# Patient Record
Sex: FEMALE | Race: WHITE | NOT HISPANIC OR LATINO | URBAN - METROPOLITAN AREA
[De-identification: names, ages, dates, MRNs, and addresses within clinical notes are randomized per-mention and may not be internally consistent; named-entity substitution may affect disease eponyms.]

---

## 2020-09-23 ENCOUNTER — INPATIENT (INPATIENT)
Facility: HOSPITAL | Age: 40
LOS: 1 days | Discharge: HOME | End: 2020-09-25
Attending: SURGERY | Admitting: SURGERY
Payer: COMMERCIAL

## 2020-09-23 VITALS
SYSTOLIC BLOOD PRESSURE: 166 MMHG | OXYGEN SATURATION: 96 % | WEIGHT: 179.9 LBS | HEIGHT: 70 IN | DIASTOLIC BLOOD PRESSURE: 104 MMHG | RESPIRATION RATE: 20 BRPM | TEMPERATURE: 100 F | HEART RATE: 75 BPM

## 2020-09-23 DIAGNOSIS — F41.9 ANXIETY DISORDER, UNSPECIFIED: ICD-10-CM

## 2020-09-23 DIAGNOSIS — K35.80 UNSPECIFIED ACUTE APPENDICITIS: ICD-10-CM

## 2020-09-23 DIAGNOSIS — F32.9 MAJOR DEPRESSIVE DISORDER, SINGLE EPISODE, UNSPECIFIED: Chronic | ICD-10-CM

## 2020-09-23 DIAGNOSIS — F32.9 MAJOR DEPRESSIVE DISORDER, SINGLE EPISODE, UNSPECIFIED: ICD-10-CM

## 2020-09-23 DIAGNOSIS — M54.9 DORSALGIA, UNSPECIFIED: ICD-10-CM

## 2020-09-23 LAB
ALBUMIN SERPL ELPH-MCNC: 4.1 G/DL — SIGNIFICANT CHANGE UP (ref 3.5–5.2)
ALP SERPL-CCNC: 77 U/L — SIGNIFICANT CHANGE UP (ref 30–115)
ALT FLD-CCNC: 22 U/L — SIGNIFICANT CHANGE UP (ref 0–41)
ANION GAP SERPL CALC-SCNC: 10 MMOL/L — SIGNIFICANT CHANGE UP (ref 7–14)
APPEARANCE UR: CLEAR — SIGNIFICANT CHANGE UP
AST SERPL-CCNC: 17 U/L — SIGNIFICANT CHANGE UP (ref 0–41)
BASOPHILS # BLD AUTO: 0.03 K/UL — SIGNIFICANT CHANGE UP (ref 0–0.2)
BASOPHILS NFR BLD AUTO: 0.4 % — SIGNIFICANT CHANGE UP (ref 0–1)
BILIRUB SERPL-MCNC: 0.2 MG/DL — SIGNIFICANT CHANGE UP (ref 0.2–1.2)
BILIRUB UR-MCNC: NEGATIVE — SIGNIFICANT CHANGE UP
BUN SERPL-MCNC: 16 MG/DL — SIGNIFICANT CHANGE UP (ref 10–20)
CALCIUM SERPL-MCNC: 9.6 MG/DL — SIGNIFICANT CHANGE UP (ref 8.5–10.1)
CHLORIDE SERPL-SCNC: 103 MMOL/L — SIGNIFICANT CHANGE UP (ref 98–110)
CO2 SERPL-SCNC: 29 MMOL/L — SIGNIFICANT CHANGE UP (ref 17–32)
COLOR SPEC: YELLOW — SIGNIFICANT CHANGE UP
CREAT SERPL-MCNC: 0.9 MG/DL — SIGNIFICANT CHANGE UP (ref 0.7–1.5)
DIFF PNL FLD: NEGATIVE — SIGNIFICANT CHANGE UP
EOSINOPHIL # BLD AUTO: 0.17 K/UL — SIGNIFICANT CHANGE UP (ref 0–0.7)
EOSINOPHIL NFR BLD AUTO: 2.2 % — SIGNIFICANT CHANGE UP (ref 0–8)
GLUCOSE SERPL-MCNC: 96 MG/DL — SIGNIFICANT CHANGE UP (ref 70–99)
GLUCOSE UR QL: NEGATIVE MG/DL — SIGNIFICANT CHANGE UP
HCG SERPL QL: NEGATIVE — SIGNIFICANT CHANGE UP
HCT VFR BLD CALC: 38.3 % — SIGNIFICANT CHANGE UP (ref 37–47)
HGB BLD-MCNC: 11.9 G/DL — LOW (ref 12–16)
IMM GRANULOCYTES NFR BLD AUTO: 0.3 % — SIGNIFICANT CHANGE UP (ref 0.1–0.3)
KETONES UR-MCNC: NEGATIVE — SIGNIFICANT CHANGE UP
LEUKOCYTE ESTERASE UR-ACNC: NEGATIVE — SIGNIFICANT CHANGE UP
LIDOCAIN IGE QN: 38 U/L — SIGNIFICANT CHANGE UP (ref 7–60)
LYMPHOCYTES # BLD AUTO: 2.36 K/UL — SIGNIFICANT CHANGE UP (ref 1.2–3.4)
LYMPHOCYTES # BLD AUTO: 30.5 % — SIGNIFICANT CHANGE UP (ref 20.5–51.1)
MCHC RBC-ENTMCNC: 27 PG — SIGNIFICANT CHANGE UP (ref 27–31)
MCHC RBC-ENTMCNC: 31.1 G/DL — LOW (ref 32–37)
MCV RBC AUTO: 86.8 FL — SIGNIFICANT CHANGE UP (ref 81–99)
MONOCYTES # BLD AUTO: 0.55 K/UL — SIGNIFICANT CHANGE UP (ref 0.1–0.6)
MONOCYTES NFR BLD AUTO: 7.1 % — SIGNIFICANT CHANGE UP (ref 1.7–9.3)
NEUTROPHILS # BLD AUTO: 4.62 K/UL — SIGNIFICANT CHANGE UP (ref 1.4–6.5)
NEUTROPHILS NFR BLD AUTO: 59.5 % — SIGNIFICANT CHANGE UP (ref 42.2–75.2)
NITRITE UR-MCNC: NEGATIVE — SIGNIFICANT CHANGE UP
NRBC # BLD: 0 /100 WBCS — SIGNIFICANT CHANGE UP (ref 0–0)
PH UR: 6.5 — SIGNIFICANT CHANGE UP (ref 5–8)
PLATELET # BLD AUTO: 231 K/UL — SIGNIFICANT CHANGE UP (ref 130–400)
POTASSIUM SERPL-MCNC: 4.4 MMOL/L — SIGNIFICANT CHANGE UP (ref 3.5–5)
POTASSIUM SERPL-SCNC: 4.4 MMOL/L — SIGNIFICANT CHANGE UP (ref 3.5–5)
PROT SERPL-MCNC: 6.5 G/DL — SIGNIFICANT CHANGE UP (ref 6–8)
PROT UR-MCNC: NEGATIVE MG/DL — SIGNIFICANT CHANGE UP
RBC # BLD: 4.41 M/UL — SIGNIFICANT CHANGE UP (ref 4.2–5.4)
RBC # FLD: 13.6 % — SIGNIFICANT CHANGE UP (ref 11.5–14.5)
SODIUM SERPL-SCNC: 142 MMOL/L — SIGNIFICANT CHANGE UP (ref 135–146)
SP GR SPEC: 1.01 — SIGNIFICANT CHANGE UP (ref 1.01–1.03)
UROBILINOGEN FLD QL: 0.2 MG/DL — SIGNIFICANT CHANGE UP (ref 0.2–0.2)
WBC # BLD: 7.75 K/UL — SIGNIFICANT CHANGE UP (ref 4.8–10.8)
WBC # FLD AUTO: 7.75 K/UL — SIGNIFICANT CHANGE UP (ref 4.8–10.8)

## 2020-09-23 PROCEDURE — 76856 US EXAM PELVIC COMPLETE: CPT | Mod: 26

## 2020-09-23 PROCEDURE — 74177 CT ABD & PELVIS W/CONTRAST: CPT | Mod: 26

## 2020-09-23 PROCEDURE — 99285 EMERGENCY DEPT VISIT HI MDM: CPT

## 2020-09-23 RX ORDER — CEFOTETAN DISODIUM 1 G
1 VIAL (EA) INJECTION ONCE
Refills: 0 | Status: DISCONTINUED | OUTPATIENT
Start: 2020-09-23 | End: 2020-09-23

## 2020-09-23 RX ORDER — ACETAMINOPHEN 500 MG
975 TABLET ORAL ONCE
Refills: 0 | Status: COMPLETED | OUTPATIENT
Start: 2020-09-23 | End: 2020-09-23

## 2020-09-23 RX ORDER — CEFOTETAN DISODIUM 1 G
2 VIAL (EA) INJECTION ONCE
Refills: 0 | Status: COMPLETED | OUTPATIENT
Start: 2020-09-23 | End: 2020-09-23

## 2020-09-23 RX ORDER — MORPHINE SULFATE 50 MG/1
4 CAPSULE, EXTENDED RELEASE ORAL ONCE
Refills: 0 | Status: DISCONTINUED | OUTPATIENT
Start: 2020-09-23 | End: 2020-09-23

## 2020-09-23 RX ADMIN — Medication 100 GRAM(S): at 23:23

## 2020-09-23 RX ADMIN — MORPHINE SULFATE 4 MILLIGRAM(S): 50 CAPSULE, EXTENDED RELEASE ORAL at 23:38

## 2020-09-23 RX ADMIN — Medication 975 MILLIGRAM(S): at 19:38

## 2020-09-23 RX ADMIN — Medication 975 MILLIGRAM(S): at 21:42

## 2020-09-23 RX ADMIN — MORPHINE SULFATE 4 MILLIGRAM(S): 50 CAPSULE, EXTENDED RELEASE ORAL at 23:23

## 2020-09-23 NOTE — H&P ADULT - ASSESSMENT
39 y/o female with PMH of depression, anxiety, chronic lower back pain presents to the ED with c/o RLQ pain x 1 day. Pt also states, she started having lower back pain about 2 weeks ago but this pain feels different. Admits to nausea and vomiting x 1 episode yesterday but nothing today. Pt also admits to some chills but no fever. Denies CP, cough, SOB and palpitations.

## 2020-09-23 NOTE — H&P ADULT - NSHPLABSRESULTS_GEN_ALL_CORE
11.9   7.75  )-----------( 231      ( 23 Sep 2020 19:55 )             38.3         142  |  103  |  16  ----------------------------<  96  4.4   |  29  |  0.9    Ca    9.6      23 Sep 2020 19:55    TPro  6.5  /  Alb  4.1  /  TBili  0.2  /  DBili  x   /  AST  17  /  ALT  22  /  AlkPhos  77            Urinalysis Basic - ( 23 Sep 2020 19:19 )    Color: Yellow / Appearance: Clear / S.015 / pH: x  Gluc: x / Ketone: Negative  / Bili: Negative / Urobili: 0.2 mg/dL   Blood: x / Protein: Negative mg/dL / Nitrite: Negative   Leuk Esterase: Negative / RBC: x / WBC x   Sq Epi: x / Non Sq Epi: x / Bacteria: x    < from: CT Abdomen and Pelvis w/ IV Cont (20 @ 22:38) >      IMPRESSION:    Acute appendicitis. No focal drainable fluid collection or pneumoperitoneum.          < end of copied text >            CAPILLARY BLOOD GLUCOSE

## 2020-09-23 NOTE — H&P ADULT - NSHPPHYSICALEXAM_GEN_ALL_CORE
Vital Signs Last 24 Hrs  T(C): 37.4 (23 Sep 2020 21:41), Max: 37.9 (23 Sep 2020 19:13)  T(F): 99.3 (23 Sep 2020 21:41), Max: 100.3 (23 Sep 2020 19:13)  HR: 64 (23 Sep 2020 21:41) (64 - 75)  BP: 139/88 (23 Sep 2020 21:41) (139/88 - 166/104)  BP(mean): --  RR: 18 (23 Sep 2020 21:41) (18 - 20)  SpO2: 99% (23 Sep 2020 21:41) (96% - 99%)        Constitutional: NAD, A&O x3    Eyes: PERRLA, no conjuctivitis    Neck: no lymphadenopathy    Respiratory: +air entry, no rales, no rhonchi, no wheezes    Cardiovascular: +S1 and S2, regular rate and rhythm    Gastrointestinal: +BS, soft, (+) TTP RLQ, no rebound, not distended    Extremities:  no edema, no calf tenderness    Neurological: sensation intact, ROM equal B/L, CN II-XII intact    Skin: no rashes, normal turgor Vital Signs Last 24 Hrs  T(C): 37.4 (23 Sep 2020 21:41), Max: 37.9 (23 Sep 2020 19:13)  T(F): 99.3 (23 Sep 2020 21:41), Max: 100.3 (23 Sep 2020 19:13)  HR: 64 (23 Sep 2020 21:41) (64 - 75)  BP: 139/88 (23 Sep 2020 21:41) (139/88 - 166/104)  RR: 18 (23 Sep 2020 21:41) (18 - 20)  SpO2: 99% (23 Sep 2020 21:41) (96% - 99%)        Constitutional: NAD, A&O x3    Eyes: PERRLA, no conjuctivitis    Neck: no lymphadenopathy    Respiratory: +air entry, no rales, no rhonchi, no wheezes    Cardiovascular: +S1 and S2, regular rate and rhythm    Gastrointestinal: +BS, soft, (+) TTP RLQ, no rebound, not distended    Extremities:  no edema, no calf tenderness    Neurological: sensation intact, ROM equal B/L, CN II-XII intact, decreased left leg raise    Skin: no rashes, normal turgor

## 2020-09-23 NOTE — ED PROVIDER NOTE - OBJECTIVE STATEMENT
The pt is a 40y F w/ chronic lumbar back pain presenting with lumbar pain x2 weeks, abdominal pain that started today. Back pain feels worse than prior episodes. Not alleviated by medications at home. She went to a chiropractor on Monday and since then the pain has been worse. Abdominal pain described as RLQ with no associated N/V. No fever at home. Denies dysuria/hematuria. Menstrual cycles normal, LMP 9/5. Denies headache, chest pain, SOB, diarrhea.

## 2020-09-23 NOTE — ED PROVIDER NOTE - PROGRESS NOTE DETAILS
Ct abd showing acute appendicitis. 2g Cefotetan ordered. Surgery consulted. Pending covid swab, pt will then be admitted under Dr. Damno.

## 2020-09-23 NOTE — ED PROVIDER NOTE - PHYSICAL EXAMINATION
Vital Signs: I have reviewed the initial vital signs. (+) Oral temp 100.3.   Constitutional: well-nourished, appears stated age, no acute distress  Cardiovascular: regular rate, regular rhythm, well-perfused extremities  Respiratory: unlabored respiratory effort, clear to auscultation bilaterally  Gastrointestinal: soft, (+) tender abdomen in the RLQ. Neg Rovsing.   Musculoskeletal: supple neck, no lower extremity edema. (-) Lumbar back, midline and paraspinal, not ttp.   Integumentary: warm, dry, no rash  Neurologic: awake, alert, extremities’ motor and sensory functions grossly intact  Psychiatric: appropriate mood, appropriate affect

## 2020-09-23 NOTE — ED PROVIDER NOTE - NS ED ROS FT
Constitutional: (-) fever  Eyes/ENT: (-) blurry vision, (-) epistaxis  Cardiovascular: (-) chest pain, (-) syncope  Respiratory: (-) cough, (-) shortness of breath  Gastrointestinal: (-) vomiting, (-) diarrhea, (+) RLQ pain   Musculoskeletal: (-) neck pain, (+) back pain, (-) joint pain  Integumentary: (-) rash, (-) edema  Neurological: (-) headache, (-) altered mental status  Psychiatric: (-) hallucinations  Allergic/Immunologic: (-) pruritus

## 2020-09-23 NOTE — H&P ADULT - ATTENDING COMMENTS
Patient seen and examined with surgery PA on rounds and discussed management plans. Obese built female hospitalized early this am with 2 days of increasing back pain mainly on left side, seen by chiropractor on Monday and Tuesday had some nausea and increased right sided abd discomfort which decreased and normal appetite and bowel function. works as a teacher. Ct scan and labs reviewed. Normal WBC with decrease this am. Ct mild dilated appendix with some surrounding inflammation. appendix retrocecal. Explained to patients options of continuing antibiotics as improving vs surgery for laparoscopic appendectomy. Patient will discuss with  and let us know. Concern fro sleep apnea as history of snoring.

## 2020-09-23 NOTE — ED PROVIDER NOTE - CLINICAL SUMMARY MEDICAL DECISION MAKING FREE TEXT BOX
pt with chronic back pain presenting with abdominal pain today. RLQ. No fevers/chills, nausea/vomiting/diarrhea, dysuria/hematuria, vaginal bleeding/discharge. Well appearing, NAD, non toxic. NCAT PERRLA EOMI neck supple non tender normal wob regular abdomen s +RLQ ttp no rebound no guarding nd no rebound no guarding WWPx4 neuro non focal. labs imaging reviewed. dw surgery. will admit

## 2020-09-23 NOTE — H&P ADULT - HISTORY OF PRESENT ILLNESS
41 y/o female with PMH of depression, anxiety, chronic lower back pain presents to the ED with c/o RLQ pain x 1 day. Pt also states, she started having lower back pain about 2 weeks ago but this pain feels different. Admits to nausea and vomiting x 1 episode yesterday but nothing today. Pt also admits to some chills but no fever. Denies CP, cough, SOB and palpitations.

## 2020-09-23 NOTE — ED ADULT TRIAGE NOTE - CHIEF COMPLAINT QUOTE
"I saw the chiropractor on Monday due to back pain x2 months, now I am in severe back pain with lower pelvic pain"

## 2020-09-24 ENCOUNTER — RESULT REVIEW (OUTPATIENT)
Age: 40
End: 2020-09-24

## 2020-09-24 DIAGNOSIS — K35.80 UNSPECIFIED ACUTE APPENDICITIS: ICD-10-CM

## 2020-09-24 LAB
ANION GAP SERPL CALC-SCNC: 9 MMOL/L — SIGNIFICANT CHANGE UP (ref 7–14)
APTT BLD: 32.1 SEC — SIGNIFICANT CHANGE UP (ref 27–39.2)
BUN SERPL-MCNC: 16 MG/DL — SIGNIFICANT CHANGE UP (ref 10–20)
CALCIUM SERPL-MCNC: 8.9 MG/DL — SIGNIFICANT CHANGE UP (ref 8.5–10.1)
CHLORIDE SERPL-SCNC: 104 MMOL/L — SIGNIFICANT CHANGE UP (ref 98–110)
CO2 SERPL-SCNC: 27 MMOL/L — SIGNIFICANT CHANGE UP (ref 17–32)
CREAT SERPL-MCNC: 0.9 MG/DL — SIGNIFICANT CHANGE UP (ref 0.7–1.5)
GLUCOSE SERPL-MCNC: 93 MG/DL — SIGNIFICANT CHANGE UP (ref 70–99)
HCT VFR BLD CALC: 38.5 % — SIGNIFICANT CHANGE UP (ref 37–47)
HGB BLD-MCNC: 12.4 G/DL — SIGNIFICANT CHANGE UP (ref 12–16)
INR BLD: 1.05 RATIO — SIGNIFICANT CHANGE UP (ref 0.65–1.3)
MAGNESIUM SERPL-MCNC: 2 MG/DL — SIGNIFICANT CHANGE UP (ref 1.8–2.4)
MCHC RBC-ENTMCNC: 27.9 PG — SIGNIFICANT CHANGE UP (ref 27–31)
MCHC RBC-ENTMCNC: 32.2 G/DL — SIGNIFICANT CHANGE UP (ref 32–37)
MCV RBC AUTO: 86.5 FL — SIGNIFICANT CHANGE UP (ref 81–99)
NRBC # BLD: 0 /100 WBCS — SIGNIFICANT CHANGE UP (ref 0–0)
PHOSPHATE SERPL-MCNC: 4.9 MG/DL — SIGNIFICANT CHANGE UP (ref 2.1–4.9)
PLATELET # BLD AUTO: 201 K/UL — SIGNIFICANT CHANGE UP (ref 130–400)
POTASSIUM SERPL-MCNC: 5.1 MMOL/L — HIGH (ref 3.5–5)
POTASSIUM SERPL-SCNC: 5.1 MMOL/L — HIGH (ref 3.5–5)
PROTHROM AB SERPL-ACNC: 12.1 SEC — SIGNIFICANT CHANGE UP (ref 9.95–12.87)
RBC # BLD: 4.45 M/UL — SIGNIFICANT CHANGE UP (ref 4.2–5.4)
RBC # FLD: 13.6 % — SIGNIFICANT CHANGE UP (ref 11.5–14.5)
SARS-COV-2 RNA SPEC QL NAA+PROBE: SIGNIFICANT CHANGE UP
SODIUM SERPL-SCNC: 140 MMOL/L — SIGNIFICANT CHANGE UP (ref 135–146)
WBC # BLD: 5.8 K/UL — SIGNIFICANT CHANGE UP (ref 4.8–10.8)
WBC # FLD AUTO: 5.8 K/UL — SIGNIFICANT CHANGE UP (ref 4.8–10.8)

## 2020-09-24 PROCEDURE — 88304 TISSUE EXAM BY PATHOLOGIST: CPT | Mod: 26

## 2020-09-24 PROCEDURE — 99222 1ST HOSP IP/OBS MODERATE 55: CPT

## 2020-09-24 PROCEDURE — 44970 LAPAROSCOPY APPENDECTOMY: CPT

## 2020-09-24 RX ORDER — DEXTROSE MONOHYDRATE, SODIUM CHLORIDE, AND POTASSIUM CHLORIDE 50; .745; 4.5 G/1000ML; G/1000ML; G/1000ML
1000 INJECTION, SOLUTION INTRAVENOUS
Refills: 0 | Status: DISCONTINUED | OUTPATIENT
Start: 2020-09-24 | End: 2020-09-24

## 2020-09-24 RX ORDER — MORPHINE SULFATE 50 MG/1
4 CAPSULE, EXTENDED RELEASE ORAL EVERY 4 HOURS
Refills: 0 | Status: DISCONTINUED | OUTPATIENT
Start: 2020-09-24 | End: 2020-09-25

## 2020-09-24 RX ORDER — IBUPROFEN 200 MG
400 TABLET ORAL EVERY 6 HOURS
Refills: 0 | Status: DISCONTINUED | OUTPATIENT
Start: 2020-09-24 | End: 2020-09-25

## 2020-09-24 RX ORDER — ONDANSETRON 8 MG/1
4 TABLET, FILM COATED ORAL EVERY 6 HOURS
Refills: 0 | Status: DISCONTINUED | OUTPATIENT
Start: 2020-09-24 | End: 2020-09-25

## 2020-09-24 RX ORDER — CEFOTETAN DISODIUM 1 G
1 VIAL (EA) INJECTION EVERY 12 HOURS
Refills: 0 | Status: DISCONTINUED | OUTPATIENT
Start: 2020-09-24 | End: 2020-09-25

## 2020-09-24 RX ORDER — CHLORHEXIDINE GLUCONATE 213 G/1000ML
1 SOLUTION TOPICAL
Refills: 0 | Status: DISCONTINUED | OUTPATIENT
Start: 2020-09-24 | End: 2020-09-24

## 2020-09-24 RX ORDER — MORPHINE SULFATE 50 MG/1
4 CAPSULE, EXTENDED RELEASE ORAL ONCE
Refills: 0 | Status: DISCONTINUED | OUTPATIENT
Start: 2020-09-24 | End: 2020-09-24

## 2020-09-24 RX ORDER — SODIUM CHLORIDE 9 MG/ML
1000 INJECTION INTRAMUSCULAR; INTRAVENOUS; SUBCUTANEOUS
Refills: 0 | Status: DISCONTINUED | OUTPATIENT
Start: 2020-09-24 | End: 2020-09-24

## 2020-09-24 RX ORDER — CEFOTETAN DISODIUM 1 G
1 VIAL (EA) INJECTION ONCE
Refills: 0 | Status: COMPLETED | OUTPATIENT
Start: 2020-09-24 | End: 2020-09-24

## 2020-09-24 RX ORDER — HYDROMORPHONE HYDROCHLORIDE 2 MG/ML
0.5 INJECTION INTRAMUSCULAR; INTRAVENOUS; SUBCUTANEOUS
Refills: 0 | Status: DISCONTINUED | OUTPATIENT
Start: 2020-09-24 | End: 2020-09-25

## 2020-09-24 RX ORDER — ONDANSETRON 8 MG/1
4 TABLET, FILM COATED ORAL EVERY 6 HOURS
Refills: 0 | Status: DISCONTINUED | OUTPATIENT
Start: 2020-09-24 | End: 2020-09-24

## 2020-09-24 RX ORDER — HEPARIN SODIUM 5000 [USP'U]/ML
5000 INJECTION INTRAVENOUS; SUBCUTANEOUS EVERY 8 HOURS
Refills: 0 | Status: DISCONTINUED | OUTPATIENT
Start: 2020-09-24 | End: 2020-09-25

## 2020-09-24 RX ORDER — SODIUM CHLORIDE 9 MG/ML
1000 INJECTION INTRAMUSCULAR; INTRAVENOUS; SUBCUTANEOUS
Refills: 0 | Status: DISCONTINUED | OUTPATIENT
Start: 2020-09-24 | End: 2020-09-25

## 2020-09-24 RX ORDER — FAMOTIDINE 10 MG/ML
20 INJECTION INTRAVENOUS EVERY 12 HOURS
Refills: 0 | Status: DISCONTINUED | OUTPATIENT
Start: 2020-09-24 | End: 2020-09-25

## 2020-09-24 RX ORDER — CEFOTETAN DISODIUM 1 G
VIAL (EA) INJECTION
Refills: 0 | Status: DISCONTINUED | OUTPATIENT
Start: 2020-09-24 | End: 2020-09-24

## 2020-09-24 RX ORDER — KETOROLAC TROMETHAMINE 30 MG/ML
30 SYRINGE (ML) INJECTION ONCE
Refills: 0 | Status: DISCONTINUED | OUTPATIENT
Start: 2020-09-24 | End: 2020-09-24

## 2020-09-24 RX ORDER — HYDROMORPHONE HYDROCHLORIDE 2 MG/ML
1 INJECTION INTRAMUSCULAR; INTRAVENOUS; SUBCUTANEOUS
Refills: 0 | Status: DISCONTINUED | OUTPATIENT
Start: 2020-09-24 | End: 2020-09-25

## 2020-09-24 RX ORDER — SODIUM CHLORIDE 9 MG/ML
1000 INJECTION, SOLUTION INTRAVENOUS
Refills: 0 | Status: DISCONTINUED | OUTPATIENT
Start: 2020-09-24 | End: 2020-09-25

## 2020-09-24 RX ORDER — ESCITALOPRAM OXALATE 10 MG/1
1 TABLET, FILM COATED ORAL
Qty: 0 | Refills: 0 | DISCHARGE

## 2020-09-24 RX ORDER — OXYCODONE AND ACETAMINOPHEN 5; 325 MG/1; MG/1
1 TABLET ORAL ONCE
Refills: 0 | Status: DISCONTINUED | OUTPATIENT
Start: 2020-09-24 | End: 2020-09-25

## 2020-09-24 RX ORDER — ESCITALOPRAM OXALATE 10 MG/1
10 TABLET, FILM COATED ORAL DAILY
Refills: 0 | Status: DISCONTINUED | OUTPATIENT
Start: 2020-09-24 | End: 2020-09-24

## 2020-09-24 RX ORDER — CEFOTETAN DISODIUM 1 G
1 VIAL (EA) INJECTION EVERY 12 HOURS
Refills: 0 | Status: DISCONTINUED | OUTPATIENT
Start: 2020-09-24 | End: 2020-09-24

## 2020-09-24 RX ORDER — ONDANSETRON 8 MG/1
4 TABLET, FILM COATED ORAL ONCE
Refills: 0 | Status: DISCONTINUED | OUTPATIENT
Start: 2020-09-24 | End: 2020-09-25

## 2020-09-24 RX ORDER — ACETAMINOPHEN 500 MG
650 TABLET ORAL EVERY 6 HOURS
Refills: 0 | Status: DISCONTINUED | OUTPATIENT
Start: 2020-09-24 | End: 2020-09-25

## 2020-09-24 RX ORDER — FAMOTIDINE 10 MG/ML
20 INJECTION INTRAVENOUS
Refills: 0 | Status: DISCONTINUED | OUTPATIENT
Start: 2020-09-24 | End: 2020-09-24

## 2020-09-24 RX ORDER — MORPHINE SULFATE 50 MG/1
2 CAPSULE, EXTENDED RELEASE ORAL EVERY 4 HOURS
Refills: 0 | Status: DISCONTINUED | OUTPATIENT
Start: 2020-09-24 | End: 2020-09-24

## 2020-09-24 RX ADMIN — DEXTROSE MONOHYDRATE, SODIUM CHLORIDE, AND POTASSIUM CHLORIDE 100 MILLILITER(S): 50; .745; 4.5 INJECTION, SOLUTION INTRAVENOUS at 03:33

## 2020-09-24 RX ADMIN — Medication 100 GRAM(S): at 17:04

## 2020-09-24 RX ADMIN — MORPHINE SULFATE 4 MILLIGRAM(S): 50 CAPSULE, EXTENDED RELEASE ORAL at 03:32

## 2020-09-24 RX ADMIN — FAMOTIDINE 20 MILLIGRAM(S): 10 INJECTION INTRAVENOUS at 11:12

## 2020-09-24 RX ADMIN — MORPHINE SULFATE 2 MILLIGRAM(S): 50 CAPSULE, EXTENDED RELEASE ORAL at 08:38

## 2020-09-24 RX ADMIN — ESCITALOPRAM OXALATE 10 MILLIGRAM(S): 10 TABLET, FILM COATED ORAL at 10:18

## 2020-09-24 RX ADMIN — MORPHINE SULFATE 2 MILLIGRAM(S): 50 CAPSULE, EXTENDED RELEASE ORAL at 15:15

## 2020-09-24 RX ADMIN — Medication 30 MILLIGRAM(S): at 13:02

## 2020-09-24 RX ADMIN — Medication 30 MILLIGRAM(S): at 14:00

## 2020-09-24 RX ADMIN — MORPHINE SULFATE 2 MILLIGRAM(S): 50 CAPSULE, EXTENDED RELEASE ORAL at 09:19

## 2020-09-24 RX ADMIN — SODIUM CHLORIDE 150 MILLILITER(S): 9 INJECTION, SOLUTION INTRAVENOUS at 23:40

## 2020-09-24 RX ADMIN — MORPHINE SULFATE 4 MILLIGRAM(S): 50 CAPSULE, EXTENDED RELEASE ORAL at 03:55

## 2020-09-24 RX ADMIN — SODIUM CHLORIDE 100 MILLILITER(S): 9 INJECTION INTRAMUSCULAR; INTRAVENOUS; SUBCUTANEOUS at 11:41

## 2020-09-24 RX ADMIN — MORPHINE SULFATE 2 MILLIGRAM(S): 50 CAPSULE, EXTENDED RELEASE ORAL at 15:42

## 2020-09-24 NOTE — PROGRESS NOTE ADULT - ATTENDING COMMENTS
Patient seen and examined with surgery PA on rounds and discussed management plans. informed consent was signed for laparoscopic appendectomy.

## 2020-09-24 NOTE — ED ADULT NURSE REASSESSMENT NOTE - NS ED NURSE REASSESS COMMENT FT1
spoke with HOMAR Bland regarding STAT medication orders, orders not needed to be given now, as per HOMAR Bland orders to be pushed til morning, not STAT orders

## 2020-09-24 NOTE — PROGRESS NOTE ADULT - SUBJECTIVE AND OBJECTIVE BOX
GENERAL SURGERY PROGRESS NOTE     LUCHO ESTRADA  40yFemale    Surgical Attending: Mahi Damon  Overnight events: No acute events overnight. Still with complaints of abdominal pain.    T(F): 98.2 (20 @ 14:41), Max: 100.3 (20 @ 19:13)  HR: 56 (20 @ 16:12) (54 - 75)  BP: 159/83 (20 @ 16:12) (139/88 - 196/98)  ABP: --  ABP(mean): --  RR: 18 (20 @ 14:41) (18 - 20)  SpO2: 99% (20 @ 21:41) (96% - 99%)      DIET/FLUIDS: sodium chloride 0.9%. 1000 milliLiter(s) (100 mL/Hr) IV Continuous <Continuous>    GI proph:  famotidine Injectable 20 milliGRAM(s) IV Push two times a day    AC/ proph:   ABx: cefoTEtan  IVPB      cefoTEtan  IVPB 1 Gram(s) IV Intermittent every 12 hours      PHYSICAL EXAM:  GENERAL: NAD, well-appearing  CHEST/LUNG: Clear to auscultation bilaterally  HEART: Regular rate and rhythm  ABDOMEN: Soft, RLQ tenderness, no guarding or rebound  EXTREMITIES:  No clubbing, cyanosis, or edema      LABS  Labs:  CAPILLARY BLOOD GLUCOSE                              12.4   5.80  )-----------( 201      ( 24 Sep 2020 06:56 )             38.5       Auto Neutrophil %: 59.5 % (20 @ 19:55)  Auto Immature Granulocyte %: 0.3 % (20 @ 19:55)        140  |  104  |  16  ----------------------------<  93  5.1<H>   |  27  |  0.9      Calcium, Total Serum: 8.9 mg/dL (20 @ 06:56)      LFTs:             6.5  | 0.2  | 17       ------------------[77      ( 23 Sep 2020 19:55 )  4.1  | x    | 22          Lipase:38     Amylase:x             Coags:     12.10  ----< 1.05    ( 24 Sep 2020 06:56 )     32.1            Urinalysis Basic - ( 23 Sep 2020 19:19 )    Color: Yellow / Appearance: Clear / S.015 / pH: x  Gluc: x / Ketone: Negative  / Bili: Negative / Urobili: 0.2 mg/dL   Blood: x / Protein: Negative mg/dL / Nitrite: Negative   Leuk Esterase: Negative / RBC: x / WBC x   Sq Epi: x / Non Sq Epi: x / Bacteria: x             GENERAL SURGERY PROGRESS NOTE     LUCHO ESTRADA  40yFemale    Surgical Attending: Mahi Damon  Overnight events: No acute events overnight. Still with complaints of abdominal pain.    T(F): 98.2 (20 @ 14:41), Max: 100.3 (20 @ 19:13)  HR: 56 (20 @ 16:12) (54 - 75)  BP: 159/83 (20 @ 16:12) (139/88 - 196/98)  RR: 18 (20 @ 14:41) (18 - 20)  SpO2: 99% (20 @ 21:41) (96% - 99%)      DIET/FLUIDS: sodium chloride 0.9%. 1000 milliLiter(s) (100 mL/Hr) IV Continuous <Continuous>    GI proph:  famotidine Injectable 20 milliGRAM(s) IV Push two times a day    AC/ proph:   ABx: cefoTEtan  IVPB      cefoTEtan  IVPB 1 Gram(s) IV Intermittent every 12 hours      PHYSICAL EXAM:  GENERAL: NAD, well-appearing  CHEST/LUNG: Clear to auscultation bilaterally  HEART: Regular rate and rhythm  ABDOMEN: Soft, RLQ tenderness, no guarding or rebound  EXTREMITIES:  No clubbing, cyanosis, or edema      LABS  Labs:  CAPILLARY BLOOD GLUCOSE                              12.4   5.80  )-----------( 201      ( 24 Sep 2020 06:56 )             38.5       Auto Neutrophil %: 59.5 % (20 @ 19:55)  Auto Immature Granulocyte %: 0.3 % (20 @ 19:55)        140  |  104  |  16  ----------------------------<  93  5.1<H>   |  27  |  0.9      Calcium, Total Serum: 8.9 mg/dL (20 @ 06:56)      LFTs:             6.5  | 0.2  | 17       ------------------[77      ( 23 Sep 2020 19:55 )  4.1  | x    | 22          Lipase:38     Amylase:x             Coags:     12.10  ----< 1.05    ( 24 Sep 2020 06:56 )     32.1            Urinalysis Basic - ( 23 Sep 2020 19:19 )    Color: Yellow / Appearance: Clear / S.015 / pH: x  Gluc: x / Ketone: Negative  / Bili: Negative / Urobili: 0.2 mg/dL   Blood: x / Protein: Negative mg/dL / Nitrite: Negative   Leuk Esterase: Negative / RBC: x / WBC x   Sq Epi: x / Non Sq Epi: x / Bacteria: x

## 2020-09-24 NOTE — BRIEF OPERATIVE NOTE - OPERATION/FINDINGS
retrocecal appendix, inflamed appendix, inflamed and thickened, some bleeding from mesoappendix controlled with clips

## 2020-09-24 NOTE — PROGRESS NOTE ADULT - ASSESSMENT
A/P:  LUCHO ESTRADA is a 40yFemale HD 2 currently admitted with acute appendicitis.      PLAN:   NPO  pain control  IVF  IV abx  Add on for laparoscopic appendectomy    Discussed plan with Dr. Damon

## 2020-09-25 ENCOUNTER — TRANSCRIPTION ENCOUNTER (OUTPATIENT)
Age: 40
End: 2020-09-25

## 2020-09-25 VITALS
DIASTOLIC BLOOD PRESSURE: 87 MMHG | SYSTOLIC BLOOD PRESSURE: 139 MMHG | HEART RATE: 91 BPM | RESPIRATION RATE: 18 BRPM | TEMPERATURE: 98 F

## 2020-09-25 LAB
SARS-COV-2 IGG SERPL QL IA: POSITIVE
SARS-COV-2 IGM SERPL IA-ACNC: 25.5 INDEX — HIGH

## 2020-09-25 PROCEDURE — 99024 POSTOP FOLLOW-UP VISIT: CPT

## 2020-09-25 RX ORDER — IBUPROFEN 200 MG
1 TABLET ORAL
Qty: 21 | Refills: 0
Start: 2020-09-25 | End: 2020-10-01

## 2020-09-25 RX ADMIN — Medication 400 MILLIGRAM(S): at 10:42

## 2020-09-25 RX ADMIN — Medication 100 GRAM(S): at 06:29

## 2020-09-25 RX ADMIN — SODIUM CHLORIDE 100 MILLILITER(S): 9 INJECTION INTRAMUSCULAR; INTRAVENOUS; SUBCUTANEOUS at 00:01

## 2020-09-25 RX ADMIN — HEPARIN SODIUM 5000 UNIT(S): 5000 INJECTION INTRAVENOUS; SUBCUTANEOUS at 06:29

## 2020-09-25 RX ADMIN — Medication 400 MILLIGRAM(S): at 11:10

## 2020-09-25 NOTE — DISCHARGE NOTE PROVIDER - NSDCMRMEDTOKEN_GEN_ALL_CORE_FT
Lexapro 10 mg oral tablet: 1 tab(s) orally once a day   ibuprofen 600 mg oral tablet: 1 tab(s) orally every 8 hours, As Needed -for  mild to moderate pain   Lexapro 10 mg oral tablet: 1 tab(s) orally once a day

## 2020-09-25 NOTE — DISCHARGE NOTE NURSING/CASE MANAGEMENT/SOCIAL WORK - PATIENT PORTAL LINK FT
You can access the FollowMyHealth Patient Portal offered by [facility] by registering at the following website: [email or web address]. By joining [employer]’s FollowMyHealth portal, you will also be able to view your health information using other applications (apps) compatible with our system.

## 2020-09-25 NOTE — CHART NOTE - NSCHARTNOTEFT_GEN_A_CORE
41 y/o female s/p Laparoscopic appendectomy. Seen and examined, NAD in bed. No complaints.   Incisions; C/D/I. Vitals stable.   A/P:  -Pain mgmt  -IV hydration  -IV abx (cefotetan x2 doses post op and D/C)  -Encourage OOB to chair/amb  -Encourage incentive astrid  -Diet; regular   -GI and DVT prophylaxis  -D/C planning

## 2020-09-25 NOTE — DISCHARGE NOTE PROVIDER - PROVIDER TOKENS
PROVIDER:[TOKEN:[[id number]],FOLLOWUP:[1 week]],PROVIDER:[TOKEN:[[id number]],FOLLOWUP:[1 week]] PROVIDER:[TOKEN:[[id number]],FOLLOWUP:[1 week]],PROVIDER:[TOKEN:[[id number]],FOLLOWUP:[1 week]]

## 2020-09-25 NOTE — DISCHARGE NOTE PROVIDER - CARE PROVIDER_API CALL
Asael Gudino  ALLERGY AND IMMUNOLOGY  [address], [address]  [place] [address]  Phone: [phone]  Fax: [phone]  Follow Up Time: 1 week    Mahi Damon  General Surgery  [address] [place] [address]  Phone: [phone]  Fax: [phone]  Follow Up Time: 1 week   Mahi Damon  General Surgery  [address] [place] [address]  Phone: [phone]  Fax: [phone]  Follow Up Time: 1 week    Preston Gudino  Internal Medicine  [address] [place] [address]  Phone: [phone]  Fax: [phone]  Follow Up Time: 1 week

## 2020-09-25 NOTE — DISCHARGE NOTE PROVIDER - NSDCCPCAREPLAN_GEN_ALL_CORE_FT
PRINCIPAL DISCHARGE DIAGNOSIS  Diagnosis: Acute appendicitis  Assessment and Plan of Treatment:   S/p laproscopic appendectomy   -Pt is to follow up with Dr. Damon  -Pt is to follow up with PCP

## 2020-09-25 NOTE — DISCHARGE NOTE PROVIDER - NSDCFUADDINST_GEN_ALL_CORE_FT
- Return to Emergency room if develop any persistent fever > 101, chills, tremors, persistent nausea/vomiting, severe pain not relieved by pain medication, inability to urinate, chest pains, difficulty breathing or any bleeding.     Leave dressing in place until seen by surgeon. Leave wound open to air after 3 days.

## 2020-09-25 NOTE — DISCHARGE NOTE PROVIDER - HOSPITAL COURSE
41 y/o female with PMH of depression, anxiety, chronic lower back pain presents to the ED with c/o RLQ pain x 1 day.    CT was done, showed Acute appendicitis. No focal drainable fluid collection or pneumoperitoneum.    Lab work reviewed, WNL    Laparoscopic appendectomy, S/P day 1.    Pt is currently feeling better, no complaints. No fever, N/V, +BM. Pt is ready for discharge home    Pt seen and examined today, day of DC    VITALS:   T(F): 97.8 (09-25-20 @ 04:00), Max: 98.7 (09-24-20 @ 20:45)  HR: 91 (09-25-20 @ 04:00) (56 - 91)  BP: 139/87 (09-25-20 @ 04:00) (125/72 - 195/103)  RR: 18 (09-25-20 @ 04:00) (18 - 22)  SpO2: 100% (09-24-20 @ 23:45) (96% - 100%)    GENERAL: NAD, lying in bed comfortably  HEAD:  Atraumatic, Normocephalic  EYES: EOMI, PERRLA, conjunctiva and sclera clear  CHEST/LUNG: Clear to auscultation bilaterally; No rales, rhonchi, wheezing, or rubs.  HEART: Regular rate and rhythm; No murmurs, rubs, or gallops  ABDOMEN: Bowel sounds present; Soft, Nontender, Nondistended. incision site present, no sign of infection   EXTREMITIES:  2+ Peripheral Pulses, brisk capillary refill. No clubbing, cyanosis, or edema  NERVOUS SYSTEM:  Alert & Oriented X3, speech clear. No deficits   SKIN: No rashes or lesions

## 2020-09-28 PROBLEM — Z00.00 ENCOUNTER FOR PREVENTIVE HEALTH EXAMINATION: Status: ACTIVE | Noted: 2020-09-28

## 2020-09-28 LAB — SURGICAL PATHOLOGY STUDY: SIGNIFICANT CHANGE UP

## 2020-10-06 ENCOUNTER — APPOINTMENT (OUTPATIENT)
Dept: SURGERY | Facility: CLINIC | Age: 40
End: 2020-10-06
Payer: MEDICARE

## 2020-10-06 VITALS
WEIGHT: 250 LBS | BODY MASS INDEX: 37.03 KG/M2 | OXYGEN SATURATION: 98 % | HEIGHT: 69 IN | TEMPERATURE: 97.8 F | HEART RATE: 69 BPM

## 2020-10-06 DIAGNOSIS — E66.9 OBESITY, UNSPECIFIED: ICD-10-CM

## 2020-10-06 DIAGNOSIS — K37 UNSPECIFIED APPENDICITIS: ICD-10-CM

## 2020-10-06 DIAGNOSIS — Z78.9 OTHER SPECIFIED HEALTH STATUS: ICD-10-CM

## 2020-10-06 DIAGNOSIS — Z80.3 FAMILY HISTORY OF MALIGNANT NEOPLASM OF BREAST: ICD-10-CM

## 2020-10-06 DIAGNOSIS — Z48.89 ENCOUNTER FOR OTHER SPECIFIED SURGICAL AFTERCARE: ICD-10-CM

## 2020-10-06 PROCEDURE — 99024 POSTOP FOLLOW-UP VISIT: CPT

## 2020-10-06 NOTE — ASSESSMENT
[FreeTextEntry1] : Satisfactory postoperative course for this 34 old female with a acute appendicitis. Status post with laparoscopic appendectomy. Patient was to resume all activities as tolerated and return to office for any problems

## 2020-10-06 NOTE — HISTORY OF PRESENT ILLNESS
[de-identified] : This 40-year-old female was hospitalized [facility] with acute onset of abdominal pain and was diagnosed has acute appendicitis based on the CT scan with dilated appendix and patient underwent laparoscopic appendectomy and was discharged in  24 hours. Patient returns for followup. [de-identified] : asymptomatic now

## 2020-10-06 NOTE — DATA REVIEWED
[FreeTextEntry1] : pathology report was reviewed patient copy was given showing acute and chronic appendicitis and periappendicitis

## 2020-10-06 NOTE — PHYSICAL EXAM
[de-identified] : Obese built female ambulatory in no discomfort [de-identified] : abdomen soft nondistended nontender with healing laparoscopic appendectomy scars without signs of infection

## 2020-12-01 ENCOUNTER — TRANSCRIPTION ENCOUNTER (OUTPATIENT)
Age: 40
End: 2020-12-01

## 2023-02-28 NOTE — ED ADULT TRIAGE NOTE - WEIGHT IN LBS
179.8 Cibinqo Pregnancy And Lactation Text: It is unknown if this medication will adversely affect pregnancy or breast feeding.  You should not take this medication if you are currently pregnant or planning a pregnancy or while breastfeeding.

## 2024-02-19 NOTE — PATIENT PROFILE ADULT - NSPROCHRONICPAIN_GEN_A_NUR
"[facility]   Physical Therapy Treatment Note      Name: Leah Minaya  Clinic Number: [id number]    Therapy Diagnosis:   Encounter Diagnoses   Name Primary?    Decreased range of motion (ROM) of left knee Yes    Decreased functional mobility and endurance      Physician: Mayur Rizzo MD    Visit Date: 2/19/2024    Evaluation Date: 1/31/2024  Authorization Period Expiration: 12/31/24  Plan of Care Expiration: 3/8/2024  Progress Note Due: 3/8/2024  Visit # / Visits authorized: 10/20 + 1   FOTO: 2nd completed 2/19/2024.  PTA Visit #: 0/5      Precautions: Standard; status post left TKA on 1/29/24 by Dr. Rizzo     Time In: 10:02 am   Time Out: 11:00 am   Total Appointment Time (timed & untimed codes): 58 minutes     Subjective     Patient reports: Some sharp episodes of knee pain behind patella. Most frustrated with her troubles sleeping.     She was compliant with home exercise program.  Response to previous treatment: gradual improvement in pain  Functional change: improving motion gradually     Pain: 2-3/10 left knee     Objective       2/19/2024:  Knee active range of motion: 0-5-95 degrees (beginning of session)  Active: 0-3-110 degrees (after propping/heel slides)    L/E Strength w/ MicroFET Muscle Leeroy Dynamometer Right Left Pain/Dysfunction with Movement   (approx 4 sec hold w/ max contraction)   Quadriceps 15.6 kg  3.4--12.4 kg      Hamstrings 9.8 kg  3.5 -- 6.7 kg          Treatment     Leah received the treatments listed below:      therapeutic exercises to develop strength, endurance, ROM, and flexibility for 26 minutes including objective:    Supine heel slides: 4 minutes with straps and pillow case    Second round: 10x10"  Supine heel prop: 3" with belt looped around feet to prevent external rotation  Cybex knee extension: 20 lbs 5" 2x10 double leg  Cybex hamstring curls - unilateral: 3.0 plates 2x10 left     manual therapy techniques: were applied to the: left knee for 12 " "minutes, including:    Passive knee flexion/extension  Grade II-III patellar mobilizations. Superior glide with passive knee extension    neuromuscular re-education activities to improve: Kinesthetic for 10 minutes. The following activities were included:    Supine quad set + straight leg raise: 3x10 left with 1/2 foam under knee  Seated long sitting quad sets + gastroc/hamstring stretch with strap: 10"x10 left with towel under knee  Terminal knee extension:  pink Cook band, 5"x15 left       therapeutic activities to improve functional performance for 10 minutes, including:    Forward step ups: 6" step 2x10 left with unilateral upper extremity support  Sit<>stands: 10x with thigh support for 20" mat    x5 with bilateral upper extremity support from black chair.    Patient Education and Home Exercises       Education provided:   - Continue home exercise program    Written Home Exercises Provided: 1/31/2024. Exercises were reviewed and Leah was able to demonstrate them prior to the end of the session.  Leah demonstrated good  understanding of the education provided. See Electronic Medical Record under Patient Instructions for exercises provided during therapy sessions    Assessment     Leah is a 58 y.o. female that presents to outpatient Physical Therapy 3 weeks post-op left total knee arthroplasty. Suspect patient is reaching maximum potential with terminal knee extension secondary to bilateral knee flexion contractures prior to surgery. Improved active knee flexion with heel slide repetitions and patellar mobilizations. Continued hesitation with sit to stands with thigh support; however, better tolerance from mat versus chair. Left quadriceps strength within 75% of right.     Leah Is progressing well towards her goals.   Patient prognosis is Good.   Patient will continue to benefit from skilled outpatient physical therapy to address the deficits listed in the problem list box on initial evaluation, " provide pt/family education and to maximize pt's level of independence in the home and community environment.     Patient's spiritual, cultural and educational needs considered and pt agreeable to plan of care and goals.  Anticipated barriers to physical therapy: Right knee osteoarthritis, range of motion limitations prior to surgery     Short Term Goals: 3 weeks   1.  Patient will demonstrate left knee ROM at least 0-5-90 degree. Progressing, not met   2.  Patient will demonstrate left quad strength via MicroFET of > 11 kg to improve transfers, gait and ADL performance. Progressing, not met   3.  Patient will report ability to sit with knee in symmetrical and comfortable knee flexed position. Progressing, not met      Long Term Goals: 6 weeks   1.  Patient will demonstrate ability to perform > 9 sit <> stand transfers w/ UE use for improved ability to stand from low surfaces. Progressing, not met   2.  Patient will demonstrate ability to negotiate a flight of stairs w/ 1 HR and reciprocal pattern. Progressing, not met   3.  Patient will perform TUG in < 14 seconds for improved negra and safety with ambulation. Met, 2/14   4.  Patient will demonstrate to PT comprehensive understanding of each HEP component without verbal cueing. Progressing, not met   5.  Patient will improve FOTO to > 55% to improve ADL performance. Progressing, not met     Plan     Maintain extension within 5 degrees. Increase flexion to 120.   Progress closed chain activities and exercises as tolerated.     Patt Wilder, PT, DPT, OCS     no

## 2024-05-31 ENCOUNTER — APPOINTMENT (OUTPATIENT)
Dept: NEUROLOGY | Facility: CLINIC | Age: 44
End: 2024-05-31
Payer: COMMERCIAL

## 2024-05-31 VITALS
RESPIRATION RATE: 18 BRPM | BODY MASS INDEX: 29.2 KG/M2 | WEIGHT: 204 LBS | SYSTOLIC BLOOD PRESSURE: 130 MMHG | HEIGHT: 70 IN | DIASTOLIC BLOOD PRESSURE: 94 MMHG | OXYGEN SATURATION: 95 % | HEART RATE: 91 BPM

## 2024-05-31 DIAGNOSIS — M54.32 SCIATICA, LEFT SIDE: ICD-10-CM

## 2024-05-31 DIAGNOSIS — Z80.8 FAMILY HISTORY OF MALIGNANT NEOPLASM OF OTHER ORGANS OR SYSTEMS: ICD-10-CM

## 2024-05-31 DIAGNOSIS — M54.17 RADICULOPATHY, LUMBOSACRAL REGION: ICD-10-CM

## 2024-05-31 DIAGNOSIS — G57.32 LESION OF LATERAL POPLITEAL NERVE, LEFT LOWER LIMB: ICD-10-CM

## 2024-05-31 PROCEDURE — 99204 OFFICE O/P NEW MOD 45 MIN: CPT

## 2024-05-31 RX ORDER — HYDROCHLOROTHIAZIDE 12.5 MG/1
12.5 CAPSULE ORAL
Qty: 30 | Refills: 0 | Status: DISCONTINUED | COMMUNITY
Start: 2020-09-28 | End: 2024-05-31

## 2024-05-31 RX ORDER — ESCITALOPRAM OXALATE 10 MG/1
10 TABLET ORAL
Qty: 90 | Refills: 0 | Status: DISCONTINUED | COMMUNITY
Start: 2020-09-10 | End: 2024-05-31

## 2024-05-31 RX ORDER — IBUPROFEN 600 MG/1
600 TABLET, FILM COATED ORAL
Qty: 21 | Refills: 0 | Status: DISCONTINUED | COMMUNITY
Start: 2020-09-25 | End: 2024-05-31

## 2024-05-31 RX ORDER — SEMAGLUTIDE 1.34 MG/ML
2 INJECTION, SOLUTION SUBCUTANEOUS
Refills: 0 | Status: ACTIVE | COMMUNITY

## 2024-05-31 RX ORDER — ESCITALOPRAM OXALATE 20 MG/1
20 TABLET, FILM COATED ORAL
Refills: 0 | Status: DISCONTINUED | COMMUNITY
End: 2024-05-31

## 2024-05-31 NOTE — DISCUSSION/SUMMARY
[FreeTextEntry1] : She has clinical manifestation suggestive of several point of neurogenic dysfunction responsible for her symptoms and neurological deficits including lumbosacral radiculopathy, sciatic neuropathy, or peroneal nerve entrapment. Will return to have NCS/EMG of lower extremities. She will have follow-up visit with podiatrist regarding management of right ankle swelling.

## 2024-05-31 NOTE — HISTORY OF PRESENT ILLNESS
[FreeTextEntry1] : She presents for evaluation of symptoms of feet for 2-3 years.  She developed intermittent numbness in the left foot, affecting toes 1-3 without inciting event. Over time, she also developed swelling and pain in the ankles. She was evaluated by podiatrist and had X-ray study was done and referred here evaluation.  She could not identify any trigger, exacerbating or relieving factors. Upon waking up in the mornings, pain in ankles is the worst. No fluctuation of swelling. She denies paresthesia, leg cramps or weakness.  In the summer of 2023, she developed severe left hip pain with radiation to posterior thigh. The pain lasted for about 2 weeks. Several months ago, she took an accidental fall and landed on left hip. She denies pain in lower back region. Bowel and bladder functions are not affected.

## 2024-05-31 NOTE — PHYSICAL EXAM
[General Appearance - Alert] : alert [General Appearance - In No Acute Distress] : in no acute distress [Oriented To Time, Place, And Person] : oriented to person, place, and time [Impaired Insight] : insight and judgment were intact [Affect] : the affect was normal [Person] : oriented to person [Place] : oriented to place [Time] : oriented to time [Short Term Intact] : short term memory intact [Remote Intact] : remote memory intact [Registration Intact] : recent registration memory intact [Span Intact] : the attention span was normal [Concentration Intact] : normal concentrating ability [Visual Intact] : visual attention was ~T not ~L decreased [Naming Objects] : no difficulty naming common objects [Repeating Phrases] : no difficulty repeating a phrase [Writing A Sentence] : no difficulty writing a sentence [Fluency] : fluency intact [Comprehension] : comprehension intact [Reading] : reading intact [Current Events] : adequate knowledge of current events [Past History] : adequate knowledge of personal past history [Vocabulary] : adequate range of vocabulary [Cranial Nerves Optic (II)] : visual acuity intact bilaterally,  visual fields full to confrontation, pupils equal round and reactive to light [Cranial Nerves Oculomotor (III)] : extraocular motion intact [Cranial Nerves Trigeminal (V)] : facial sensation intact symmetrically [Cranial Nerves Facial (VII)] : face symmetrical [Cranial Nerves Vestibulocochlear (VIII)] : hearing was intact bilaterally [Cranial Nerves Glossopharyngeal (IX)] : tongue and palate midline [Cranial Nerves Accessory (XI - Cranial And Spinal)] : head turning and shoulder shrug symmetric [Cranial Nerves Hypoglossal (XII)] : there was no tongue deviation with protrusion [Motor Tone] : muscle tone was normal in all four extremities [No Muscle Atrophy] : normal bulk in all four extremities [Motor Handedness Right-Handed] : the patient is right hand dominant [Sensation Vibration Decrease] : vibration was intact [Proprioception] : proprioception was intact [Romberg's Sign] : Romberg's sign was negtive [Allodynia] : no ~T allodynia present [Balance] : balance was intact [Limited Balance] : balance was intact [Past-pointing] : there was no past-pointing [Tremor] : no tremor present [3+] : Patella left 3+ [2+] : Ankle jerk left 2+ [Plantar Reflex Right Only] : normal on the right [Plantar Reflex Left Only] : normal on the left [___] : absent on the right [___] : absent on the left [Primitive Reflexes] : primitive reflexes were absent [Glabellar Reflex] : the glabellar reflex was absent [[email or web address]] : mild left eversion weakness of ankle, graded at 5-/5 [No Spinal Tenderness] : no spinal tenderness [Abnormal Walk] : normal gait [FreeTextEntry1] : mild tenderness of right ankle with minimal swelling over the lateral aspect. Tinel of fibular head and tarsal tunnel was negative [] : no rash [Skin Lesions] : no skin lesions

## 2024-10-23 ENCOUNTER — APPOINTMENT (OUTPATIENT)
Dept: NEUROLOGY | Facility: CLINIC | Age: 44
End: 2024-10-23

## 2025-05-13 ENCOUNTER — NON-APPOINTMENT (OUTPATIENT)
Age: 45
End: 2025-05-13